# Patient Record
Sex: MALE
[De-identification: names, ages, dates, MRNs, and addresses within clinical notes are randomized per-mention and may not be internally consistent; named-entity substitution may affect disease eponyms.]

---

## 2018-08-11 ENCOUNTER — HOSPITAL (OUTPATIENT)
Dept: OTHER | Age: 33
End: 2018-08-11
Attending: FAMILY MEDICINE

## 2018-08-16 ENCOUNTER — HOSPITAL (OUTPATIENT)
Dept: OTHER | Age: 33
End: 2018-08-16
Attending: EMERGENCY MEDICINE

## 2018-08-16 LAB
ALBUMIN SERPL-MCNC: 3.9 GM/DL (ref 3.6–5.1)
ALP SERPL-CCNC: 84 UNIT/L (ref 45–117)
ALT SERPL-CCNC: 30 UNIT/L
ANALYZER ANC (IANC): ABNORMAL
ANION GAP SERPL CALC-SCNC: 13 MMOL/L (ref 10–20)
AST SERPL-CCNC: 18 UNIT/L
BASOPHILS # BLD: 0 THOUSAND/MCL (ref 0–0.3)
BASOPHILS NFR BLD: 0 %
BILIRUB CONJ SERPL-MCNC: <0.1 MG/DL (ref 0–0.2)
BILIRUB SERPL-MCNC: 0.3 MG/DL (ref 0.2–1)
BUN SERPL-MCNC: 12 MG/DL (ref 6–20)
BUN/CREAT SERPL: 16 (ref 7–25)
CALCIUM SERPL-MCNC: 8.3 MG/DL (ref 8.4–10.2)
CHLORIDE: 108 MMOL/L (ref 98–107)
CO2 SERPL-SCNC: 23 MMOL/L (ref 21–32)
CREAT SERPL-MCNC: 0.75 MG/DL (ref 0.67–1.17)
DIFFERENTIAL METHOD BLD: ABNORMAL
EOSINOPHIL # BLD: 0 THOUSAND/MCL (ref 0.1–0.5)
EOSINOPHIL NFR BLD: 0 %
ERYTHROCYTE [DISTWIDTH] IN BLOOD: 13.4 % (ref 11–15)
GLUCOSE SERPL-MCNC: 114 MG/DL (ref 65–99)
HEMATOCRIT: 45.2 % (ref 39–51)
HGB BLD-MCNC: 16 GM/DL (ref 13–17)
LACTATE BLDV-SCNC: 0.8 MMOL/L (ref 0–2)
LIPASE SERPL-CCNC: 78 UNIT/L (ref 73–393)
LYMPHOCYTES # BLD: 0.9 THOUSAND/MCL (ref 1–4.8)
LYMPHOCYTES NFR BLD: 8 %
MAGNESIUM SERPL-MCNC: 2.1 MG/DL (ref 1.7–2.4)
MCH RBC QN AUTO: 30.3 PG (ref 26–34)
MCHC RBC AUTO-ENTMCNC: 35.4 GM/DL (ref 32–36.5)
MCV RBC AUTO: 85.6 FL (ref 78–100)
MONOCYTES # BLD: 0.4 THOUSAND/MCL (ref 0.3–0.9)
MONOCYTES NFR BLD: 4 %
NEUTROPHILS # BLD: 10.4 THOUSAND/MCL (ref 1.8–7.7)
NEUTROPHILS NFR BLD: 88 %
NEUTS SEG NFR BLD: ABNORMAL %
NRBC (NRBCRE): ABNORMAL
PLATELET # BLD: 202 THOUSAND/MCL (ref 140–450)
POTASSIUM SERPL-SCNC: 3.9 MMOL/L (ref 3.4–5.1)
PROT SERPL-MCNC: 7.6 GM/DL (ref 6.4–8.2)
RBC # BLD: 5.28 MILLION/MCL (ref 4.5–5.9)
SODIUM SERPL-SCNC: 140 MMOL/L (ref 135–145)
WBC # BLD: 11.7 THOUSAND/MCL (ref 4.2–11)

## 2021-07-14 ENCOUNTER — OFFICE VISIT (OUTPATIENT)
Dept: FAMILY MEDICINE CLINIC | Facility: CLINIC | Age: 36
End: 2021-07-14
Payer: COMMERCIAL

## 2021-07-14 VITALS
TEMPERATURE: 97 F | WEIGHT: 195 LBS | OXYGEN SATURATION: 99 % | HEIGHT: 66 IN | RESPIRATION RATE: 18 BRPM | BODY MASS INDEX: 31.34 KG/M2

## 2021-07-14 DIAGNOSIS — R42 DIZZINESS: ICD-10-CM

## 2021-07-14 DIAGNOSIS — T63.444A BEE STING, UNDETERMINED INTENT, INITIAL ENCOUNTER: Primary | ICD-10-CM

## 2021-07-14 PROCEDURE — 3008F BODY MASS INDEX DOCD: CPT | Performed by: NURSE PRACTITIONER

## 2021-07-14 PROCEDURE — 99202 OFFICE O/P NEW SF 15 MIN: CPT | Performed by: NURSE PRACTITIONER

## 2021-07-14 RX ORDER — LISINOPRIL 10 MG/1
10 TABLET ORAL NIGHTLY
COMMUNITY
Start: 2021-05-17

## 2021-07-14 NOTE — PATIENT INSTRUCTIONS
Dizziness (Uncertain Cause)  Dizziness is a common symptom. It may be described as lightheadedness, spinning, or feeling like you are going to faint. Dizziness can have many causes.    Tell the healthcare provider about:   · All medicines you take, includ (palpitations)  · Passing out or seizure  · Trouble walking or speaking  Moises last reviewed this educational content on 2/1/2020  © 0004-6460 [employer]. All rights reserved.  This information is not intended as a substitute for celio may be used to reduce itching if large areas of the skin are involved. It may make you sleepy. So be careful using it in the daytime or when going to school, working, or driving.  Note: Don’t use diphenhydramine if you have glaucoma or if you are a man with blister, if there is one. · Next apply an ice pack for 5 to 10 minutes. To make an ice pack, put ice cubes in a plastic bag that seals at the top. Wrap the bag in a clean, thin towel or cloth. Don’t put ice directly on the skin.   · Contact your healthcare is a rescue medicine only. You still need someone to take you to the hospital or call 911 after you have received the medicine. Follow-up care  Follow up with your healthcare provider, or as advised if your symptoms do not keep improving.    Call 911  Ca przsegundo? mo?e by? rosangela?e co?, co Pa? stwo na? o?yli na skór?, co znajduje si? w Pa? stwa w?osach b?d? catherine estrada w perez. Cz?sto zdarza si?, ?e odnalezienie dok?adcarmen przyczjess alexander nirpeethi?dale.   Pawel curry (treatment) catherine bettina w ci?shahzad dnia. Helga Post?gudelia je?thomas objawy jose b?d? ust?powa?, to w ci?shahzad olivares dni skontaktowa? si? sara urbina nasz? placówk? . Je?thomas sorenson by?a silna (severe reaction), b?d? w przesz? o?ci alberto Hackett? pablo contreras ? faith alo. Nessa krishnano odró?ni? miejsow? reakcj? (local reaction) na uk?szenie/u? ? dlenie (bite/sting) owada od wczesnego stadium infekcji (early infection), dlatego mo?na christopher?? za?ywa? bernarda (antibiotics).   Elio Sandhu:  1. Je?thoams sw?celestina (itchin ?e przepigricelda zosta? son michael (pain medicine). [[facility]: Abhi Zimmerman? Pa?stwo na przewlek? ? chorob? w?chucky murry (chronic liver or kidney disease), b?d? cara escobar na ?o? ?dku (stomach ulcer), shauna calloway opuchni?cie (swelling)  · Pojawienie si? nowej b?d? zwi?kszenie si? opal neumann, drake baca jamy ustnej, alex? a albo j?zyka (swelling in the face, eyelids, lips, mouth, throat or tongue)  · Problemy z prze?ykaniem lub oddychaniem (trouble swallowi mo?e by? sharmaine small. · Je?li zawroty g?owy utrzymuj? si? d?u?ej ni? kilka sekund, usi?d? lub po?ó? si? i poczekaj, a? min? . Areli Zhu temu uniknvargasz obra? e? w razie ewentualnego omdlenia. Tinnie Sandwich si? [address], wsta? shayan.   · Colten Gallardo prowad? Lilliana Prude

## 2021-07-14 NOTE — PROGRESS NOTES
CHIEF COMPLAINT:     Patient presents with: Other: wasp sting      HPI:   Austyn Hill is a 29year old male who presents with complaints of stung by wasp twice yesterday. Pt reports a little dizziness - \"once in awhile\". Not currently dizzy.  Pt repor forehead - erythematous papule noted and on left upper check erythematous papule noted.    HEAD: atraumatic, normocephalic  EYES: conjunctiva clear, EOM intact, PERRLA  EARS: TM's gray, no bulging, no retraction, no fluid, bony landmarks visible  NOSE: nost may be described as lightheadedness, spinning, or feeling like you are going to faint. Dizziness can have many causes.    Tell the healthcare provider about:   · All medicines you take, including prescription, over-the-counter, herbs, and supplements  · Any Moises last reviewed this educational content on 2/1/2020  © 3436-3027 [employer]. All rights reserved. This information is not intended as a substitute for professional medical care.  Always follow your healthcare professional's instructio may make you sleepy. So be careful using it in the daytime or when going to school, working, or driving. Note: Don’t use diphenhydramine if you have glaucoma or if you are a man with trouble urinating due to an enlarged prostate.  There are other antihistam To make an ice pack, put ice cubes in a plastic bag that seals at the top. Wrap the bag in a clean, thin towel or cloth. Don’t put ice directly on the skin.   · Contact your healthcare provider and ask what can be used to help decrease the swelling and itch hospital or call 911 after you have received the medicine. Follow-up care  Follow up with your healthcare provider, or as advised if your symptoms do not keep improving.    Call 911  Call 911 if any of these occur:   · Trouble breathing or swallowing, wh si? w Pa? stwa w?osach b?d? jest obecne w perez. Cz?sto zdarza si?, ?e odnalezienie dok?ayesha jarrett jest niemo?liwe. Celem tigist curry (treatment) jest z?agodzenie objawów.  [place] (rash) elhamzwycamor healyt?marlon sellers ci?shahzad montero ust?powa?, to w ci?gu dwóch dni mervin? si? ze marilee urbina nasz? placówk? . Je?thomas sorenson by?a silna (severe reaction), b?d? w przesz? o?ci alberto Pa? pablo contreras ? rednio-intensywnych kolby velasco (allergic reactions), prosz? por uk?szenie/u? ? dlenie (bite/sting) owada od wczesnego stadium infekcji (early infection), dlatego mo?na zacz?? za?ywa? bernarda (antibiotics). Donnell Mendoza:  1. Je?li sw?dzenie (itching) jest uci? ?liwe, nale?y unika? anne silverman? do [place] Marcelo Cesar? Pa?stwo na przewlek? ? chorob? w?chucky murry (chronic liver or kidney disease), b?d? cara escobar na ?o? ?dku (stomach ulcer), shauna jerry przewjeovanyu ronaldo (GI bleeding), w bayron przypadku przed za?cielo martinez na phuong neumann warg, Arabella Castillo, alex? a albo j?zyka (swelling in the face, eyelids, lips, mouth, throat or tongue)  · Problemy z prze?ykaniem lub oddychaniem (trouble swallowing or breathing)  · Zawroty g?owy (dizziness), s?abo?? (weakness), utrata przyt ben sekund, usi?d? lub po?ó? si? i poczekaj, a? min? . Brenda Breed temu unikniesz obra? e? w razie ewentualnego omdlenia. Ezra Lamb si? [address] , wsta? shayan.   · Melissa Benz prowad? poministeriow qing, jose obs?uguj du?[facility] maszyn ani niebezpiecznego sprz?tu brice